# Patient Record
Sex: MALE | Race: WHITE | NOT HISPANIC OR LATINO | Employment: OTHER | URBAN - METROPOLITAN AREA
[De-identification: names, ages, dates, MRNs, and addresses within clinical notes are randomized per-mention and may not be internally consistent; named-entity substitution may affect disease eponyms.]

---

## 2023-11-08 ENCOUNTER — OFFICE VISIT (OUTPATIENT)
Dept: PAIN MEDICINE | Facility: CLINIC | Age: 78
End: 2023-11-08
Payer: MEDICARE

## 2023-11-08 VITALS
BODY MASS INDEX: 28.93 KG/M2 | DIASTOLIC BLOOD PRESSURE: 78 MMHG | HEIGHT: 66 IN | RESPIRATION RATE: 15 BRPM | TEMPERATURE: 96.8 F | WEIGHT: 180 LBS | HEART RATE: 60 BPM | SYSTOLIC BLOOD PRESSURE: 142 MMHG

## 2023-11-08 DIAGNOSIS — G89.29 CHRONIC RIGHT-SIDED LOW BACK PAIN WITH RIGHT-SIDED SCIATICA: ICD-10-CM

## 2023-11-08 DIAGNOSIS — M47.816 LUMBAR SPONDYLOSIS: ICD-10-CM

## 2023-11-08 DIAGNOSIS — M54.41 CHRONIC RIGHT-SIDED LOW BACK PAIN WITH RIGHT-SIDED SCIATICA: ICD-10-CM

## 2023-11-08 DIAGNOSIS — M54.16 LUMBAR NEURITIS: Primary | ICD-10-CM

## 2023-11-08 PROCEDURE — 99214 OFFICE O/P EST MOD 30 MIN: CPT | Performed by: ANESTHESIOLOGY

## 2023-11-08 RX ORDER — GLUCOSAMINE/CHONDRO SU A 500-400 MG
TABLET ORAL 3 TIMES DAILY
COMMUNITY

## 2023-11-08 RX ORDER — DOXEPIN HYDROCHLORIDE 25 MG/1
25 CAPSULE ORAL NIGHTLY
COMMUNITY

## 2023-11-08 RX ORDER — LEFLUNOMIDE 10 MG/1
20 TABLET ORAL DAILY
COMMUNITY

## 2023-11-08 RX ORDER — BISMUTH SUBSALICYLATE 262 MG
1 TABLET,CHEWABLE ORAL DAILY
COMMUNITY

## 2023-11-08 RX ORDER — SERTRALINE HYDROCHLORIDE 100 MG/1
100 TABLET, FILM COATED ORAL DAILY
COMMUNITY

## 2023-11-08 RX ORDER — MELATONIN 3 MG
CAPSULE ORAL
COMMUNITY

## 2023-11-08 RX ORDER — HYDROCHLOROTHIAZIDE 25 MG/1
25 TABLET ORAL DAILY
COMMUNITY

## 2023-11-08 RX ORDER — ASPIRIN 81 MG/1
81 TABLET ORAL DAILY
COMMUNITY

## 2023-11-08 RX ORDER — VIT C/E/ZN/COPPR/LUTEIN/ZEAXAN 250MG-90MG
50 CAPSULE ORAL DAILY
COMMUNITY

## 2023-11-08 RX ORDER — CYCLOBENZAPRINE HCL 10 MG
10 TABLET ORAL 3 TIMES DAILY PRN
COMMUNITY

## 2023-11-08 RX ORDER — ACETAMINOPHEN AND CODEINE PHOSPHATE 300; 60 MG/1; MG/1
TABLET ORAL 3 TIMES DAILY PRN
COMMUNITY

## 2023-11-08 RX ORDER — ATENOLOL 50 MG/1
50 TABLET ORAL DAILY
COMMUNITY

## 2023-11-08 RX ORDER — DOCUSATE SODIUM 100 MG/1
100 CAPSULE, LIQUID FILLED ORAL 2 TIMES DAILY PRN
COMMUNITY

## 2023-11-08 RX ORDER — OMEPRAZOLE 20 MG/1
20 CAPSULE, DELAYED RELEASE ORAL
COMMUNITY

## 2023-11-08 RX ORDER — GLUCOSAM/CHONDRO/HERB 149/HYAL 750-100 MG
TABLET ORAL
COMMUNITY

## 2023-11-08 RX ORDER — ROSUVASTATIN CALCIUM 10 MG/1
10 TABLET, COATED ORAL DAILY
COMMUNITY

## 2023-11-08 SDOH — ECONOMIC STABILITY: FOOD INSECURITY: WITHIN THE PAST 12 MONTHS, THE FOOD YOU BOUGHT JUST DIDN'T LAST AND YOU DIDN'T HAVE MONEY TO GET MORE.: NEVER TRUE

## 2023-11-08 SDOH — ECONOMIC STABILITY: FOOD INSECURITY: WITHIN THE PAST 12 MONTHS, YOU WORRIED THAT YOUR FOOD WOULD RUN OUT BEFORE YOU GOT MONEY TO BUY MORE.: NEVER TRUE

## 2023-11-08 ASSESSMENT — LIFESTYLE VARIABLES
HOW OFTEN DO YOU HAVE A DRINK CONTAINING ALCOHOL: NEVER
AUDIT-C TOTAL SCORE: 0
HOW OFTEN DO YOU HAVE SIX OR MORE DRINKS ON ONE OCCASION: NEVER
HOW MANY STANDARD DRINKS CONTAINING ALCOHOL DO YOU HAVE ON A TYPICAL DAY: PATIENT DOES NOT DRINK
SKIP TO QUESTIONS 9-10: 1

## 2023-11-08 ASSESSMENT — PATIENT HEALTH QUESTIONNAIRE - PHQ9
2. FEELING DOWN, DEPRESSED OR HOPELESS: NOT AT ALL
SUM OF ALL RESPONSES TO PHQ9 QUESTIONS 1 AND 2: 0
1. LITTLE INTEREST OR PLEASURE IN DOING THINGS: NOT AT ALL

## 2023-11-08 ASSESSMENT — COLUMBIA-SUICIDE SEVERITY RATING SCALE - C-SSRS
6. HAVE YOU EVER DONE ANYTHING, STARTED TO DO ANYTHING, OR PREPARED TO DO ANYTHING TO END YOUR LIFE?: NO
2. HAVE YOU ACTUALLY HAD ANY THOUGHTS OF KILLING YOURSELF?: NO
1. IN THE PAST MONTH, HAVE YOU WISHED YOU WERE DEAD OR WISHED YOU COULD GO TO SLEEP AND NOT WAKE UP?: NO

## 2023-11-08 ASSESSMENT — PAIN SCALES - GENERAL: PAINLEVEL: 6

## 2023-11-08 NOTE — PROGRESS NOTES
History Of Present Illness  MARYCHUY Gray is a 77 y.o. male presenting with   Chief Complaint   Patient presents with    Back Pain       Patient presents with complaints of chronic lo2w back pain to the RLE. The pain is constant, worse with activity and better with rest. The pain is sharp, stabbing and shooting to the B/L LE. Denies LE paresthesias, weakness, saddle anesthesia, bowel or bladder incontinence. To manage this pain the patient has attempted Tylenol #4 and Cyclobenzaprine. The patients chronic PTSD,  HTN, GERD, DLD,  are stable on medication management.     PAIN SCORE: 5-6/10.    PCP: Dr. Minor ([facility])        Past Medical History  He has a past medical history of Hyperlipemia, Hypertension, Neuropathy, and PTSD (post-traumatic stress disorder).    Surgical History  He has a past surgical history that includes Colon surgery.     Social History  He reports that he has never smoked. He has never used smokeless tobacco. He reports that he does not drink alcohol and does not use drugs.    Daughter is a RN/PhD at [employer]    Family History  No family history on file.     Allergies  Gabapentin, Lisinopril, and Prednisone    Review of Systems    All other systems reviewed and negative for any deficits. Pertinent positives and negatives were considered in the medical decision making process.        Physical Exam  /78   Pulse 60   Temp 36 °C (96.8 °F)   Resp 15   Wt 81.6 kg (180 lb)     General: Pt appears stated age    Eyes: Conjunctiva non-icteric and lids without obvious rash or drooping. Pupils are symmetric    ENT: External ears and nose appear to be without deformity or rash. No lesions or masses noted. Hearing is grossly intact    Neck: No JVD noted, tracheal position midline. No goiter noted on assessment of thyroid    Respiratory: No gasping or shortness of breath noted, no use of accessory muscles noted    Cardiovascular: Extremities show no edema or varicosities    Skin: No rashes or open  lesions/ulcers identified on skin. No induration/tightening noted with palpation of skin    Musculoskeletal: Gait is grossly normal    Digits/nails show no clubbing or cyanosis    Exam of muscles/joints/bones shows no gross atrophy and no abnormal/involuntary movements in the head/neckNo asymmetry or masses noted in the head/neck    Stability: no subluxation noted on movement of bilateral upper extremities or head/neck    Strength: 3/5 in RLE with give away weakness and 4/5 in LLE     Range of Motion: WNL     Sensation: WNL    Cranial nerves 2-12 are grossly intact    Psychiatric: Pt is alert and oriented to time, place and person.         Assessment/Plan   1. Lumbar neuritis        2. Chronic right-sided low back pain with right-sided sciatica        3. Lumbar spondylosis            I have provided the patient with a list of physical therapy exercises to learn and perform to strengthen core, maintain stabilization, and reduce pain. We reviewed the exercises in detail and I encouraged them to perform them on a regular basis.  The pt did not bring copies of his x-rays / MRIs etc. He [facility]  The pt reports his bottom 3 discs in his spine have degenerated with spur formation and calcifications.   The pt may be a candidate for epidural steroid injection pending his MRI findings.   We did discuss the risks, benefits, and alternatives to chronic opioid therapy and that usage can be a danger to life. We also discussed proper and safe storage of medication to prevent unauthorized usage. The patient understands and will use the medicine responsibly.  The pt reports he is currently taking Tylenol # 4 only one tablet once a day.  The patient and I discussed the nature of this medication and its side effects.  We discussed tolerance, physical dependence, psychological dependence, addiction and opioid-induced hyperalgesia.  Pt reports he obtains this thru his PCP. As long  as he abides by his contract I believe he can safely continue on this with careful monitoring due to his hx of PTSD. We did discuss the risks of tolerance in detail. Oarrs report shows he fills 90 tabs every 1-2 months. Pt understands that in general our practice does not recommend chronic opiate use for non-cancer pain.   The pt [facility] Gabapentin [facility] and reports he had insomnia and when he did fall asleep he had nightmares.   Pt will sign a release for [facility] from [facility] in consideration of his neuropathic findings and for an epidural injection.        Tony Salgado MD    I spent time with the patient reviewing their imaging and discussing the risks benefits and alternatives to the above plan. A total of 45 minutes was spent reviewing the data and greater than 50% of that time was with the patient during the face to face encounter discussing treatment options both surgical, non-surgical, and minimally invasive techniques.

## 2023-11-08 NOTE — PROGRESS NOTES
Low back pain down the right leg.  History of hip and knee pain.  Feet and hand pain.  Denies back and neck surgery

## 2023-12-04 ENCOUNTER — APPOINTMENT (OUTPATIENT)
Dept: PAIN MEDICINE | Facility: CLINIC | Age: 78
End: 2023-12-04
Payer: MEDICARE

## 2024-01-25 ENCOUNTER — APPOINTMENT (OUTPATIENT)
Dept: PAIN MEDICINE | Facility: CLINIC | Age: 79
End: 2024-01-25
Payer: MEDICARE